# Patient Record
Sex: MALE | Race: WHITE | URBAN - METROPOLITAN AREA
[De-identification: names, ages, dates, MRNs, and addresses within clinical notes are randomized per-mention and may not be internally consistent; named-entity substitution may affect disease eponyms.]

---

## 2017-04-27 ENCOUNTER — OFFICE VISIT (OUTPATIENT)
Dept: FAMILY MEDICINE | Facility: CLINIC | Age: 37
End: 2017-04-27
Payer: COMMERCIAL

## 2017-04-27 VITALS
HEART RATE: 64 BPM | WEIGHT: 137 LBS | BODY MASS INDEX: 22.02 KG/M2 | TEMPERATURE: 96.2 F | HEIGHT: 66 IN | SYSTOLIC BLOOD PRESSURE: 112 MMHG | DIASTOLIC BLOOD PRESSURE: 60 MMHG

## 2017-04-27 DIAGNOSIS — Z00.00 ROUTINE GENERAL MEDICAL EXAMINATION AT A HEALTH CARE FACILITY: ICD-10-CM

## 2017-04-27 DIAGNOSIS — K21.9 GASTROESOPHAGEAL REFLUX DISEASE WITHOUT ESOPHAGITIS: ICD-10-CM

## 2017-04-27 DIAGNOSIS — R35.0 URINARY FREQUENCY: ICD-10-CM

## 2017-04-27 DIAGNOSIS — Z13.6 CARDIOVASCULAR SCREENING; LDL GOAL LESS THAN 160: Primary | ICD-10-CM

## 2017-04-27 LAB
ALBUMIN UR-MCNC: NEGATIVE MG/DL
APPEARANCE UR: CLEAR
BILIRUB UR QL STRIP: NEGATIVE
COLOR UR AUTO: YELLOW
GLUCOSE UR STRIP-MCNC: NEGATIVE MG/DL
HGB UR QL STRIP: NEGATIVE
KETONES UR STRIP-MCNC: NEGATIVE MG/DL
LEUKOCYTE ESTERASE UR QL STRIP: NEGATIVE
NITRATE UR QL: NEGATIVE
PH UR STRIP: 7 PH (ref 5–7)
SP GR UR STRIP: 1.02 (ref 1–1.03)
URN SPEC COLLECT METH UR: NORMAL
UROBILINOGEN UR STRIP-ACNC: 0.2 EU/DL (ref 0.2–1)

## 2017-04-27 PROCEDURE — 99395 PREV VISIT EST AGE 18-39: CPT | Performed by: FAMILY MEDICINE

## 2017-04-27 PROCEDURE — 99212 OFFICE O/P EST SF 10 MIN: CPT | Mod: 25 | Performed by: FAMILY MEDICINE

## 2017-04-27 PROCEDURE — 81003 URINALYSIS AUTO W/O SCOPE: CPT | Performed by: FAMILY MEDICINE

## 2017-04-27 RX ORDER — NICOTINE POLACRILEX 4 MG/1
20 GUM, CHEWING ORAL DAILY
Qty: 30 TABLET | Refills: 3 | Status: SHIPPED | OUTPATIENT
Start: 2017-04-27 | End: 2018-01-03

## 2017-04-27 NOTE — MR AVS SNAPSHOT
After Visit Summary   4/27/2017    Sudarshan Melendez    MRN: [id number]           Patient Information     Date Of Birth          1980        Visit Information        Provider Department      4/27/2017 1:00 PM Lauro Sanchez MD [facility]        Today's Diagnoses     CARDIOVASCULAR SCREENING; LDL GOAL LESS THAN 160    -  1    Routine general medical examination at a health care facility        Gastroesophageal reflux disease without esophagitis        Urinary frequency          Care Instructions      Preventive Health Recommendations  Male Ages 26 - 39    Yearly exam:             See your health care provider every year in order to  o   Review health changes.   o   Discuss preventive care.    o   Review your medicines if your doctor has prescribed any.    You should be tested each year for STDs (sexually transmitted diseases), if you re at risk.     After age 35, talk to your provider about cholesterol testing. If you are at risk for heart disease, have your cholesterol tested at least every 5 years.     If you are at risk for diabetes, you should have a diabetes test (fasting glucose).  Shots: Get a flu shot each year. Get a tetanus shot every 10 years.     Nutrition:    Eat at least 5 servings of fruits and vegetables daily.     Eat whole-grain bread, whole-wheat pasta and brown rice instead of white grains and rice.     Talk to your provider about Calcium and Vitamin D.     Lifestyle    Exercise for at least 150 minutes a week (30 minutes a day, 5 days a week). This will help you control your weight and prevent disease.     Limit alcohol to one drink per day.     No smoking.     Wear sunscreen to prevent skin cancer.     See your dentist every six months for an exam and cleaning.           Follow-ups after your visit        Future tests that were ordered for you today     Open Future Orders        Priority Expected Expires Ordered    Vitamin D Deficiency Routine  4/27/2018  "2017    Comprehensive metabolic panel Routine  2018    Lipid Profile (Chol, Trig, HDL, LDL calc) Routine  2018    Hemoglobin A1c Routine  2018            Who to contact     If you have questions or need follow up information about today's clinic visit or your schedule please contact [facility] [place] directly at [phone].  Normal or non-critical lab and imaging results will be communicated to you by Mobiihart, letter or phone within 4 business days after the clinic has received the results. If you do not hear from us within 7 days, please contact the clinic through Mobiihart or phone. If you have a critical or abnormal lab result, we will notify you by phone as soon as possible.  Submit refill requests through [employer] or call your pharmacy and they will forward the refill request to us. Please allow 3 business days for your refill to be completed.          Additional Information About Your Visit        [employer] Information     [employer] lets you send messages to your doctor, view your test results, renew your prescriptions, schedule appointments and more. To sign up, go to [email or web address] . Click on \"Log in\" on the left side of the screen, which will take you to the Welcome page. Then click on \"Sign up Now\" on the right side of the page.     You will be asked to enter the access code listed below, as well as some personal information. Please follow the directions to create your username and password.     Your access code is: [email or web address]  Expires: 2017  1:42 PM     Your access code will  in 90 days. If you need help or a new code, please call your [facility] clinic or [phone].        Care EveryWhere ID     This is your Care EveryWhere ID. This could be used by other organizations to access your [facility] medical records  [id number]        Your Vitals Were     Pulse Temperature Height BMI (Body Mass Index)          64 96.2  F (35.7  C) " "(Tympanic) 5' 6\" (1.676 m) 22.11 kg/m2         Blood Pressure from Last 3 Encounters:   04/27/17 112/60   10/26/15 108/64   02/20/14 118/74    Weight from Last 3 Encounters:   04/27/17 137 lb (62.1 kg)   10/26/15 143 lb (64.9 kg)   02/20/14 136 lb (61.7 kg)              We Performed the Following     UA reflex to Microscopic          Today's Medication Changes          These changes are accurate as of: 4/27/17  1:42 PM.  If you have any questions, ask your nurse or doctor.               Stop taking these medicines if you haven't already. Please contact your care team if you have questions.     saccharomyces boulardii 250 MG capsule   Commonly known as:  FLORASTOR   Stopped by:  Lauro Sanchez MD                Where to get your medicines      These medications were sent to [facility] [address] [place] [address] AT [facility] OF [facility] [address] [address]     Phone:  [phone]     omeprazole 20 MG tablet                Primary Care Provider Office Phone # Fax #    Lucian RiveraDO [phone]       [address] [address]        Thank you!     Thank you for choosing [facility]  for your care. Our goal is always to provide you with excellent care. Hearing back from our patients is one way we can continue to improve our services. Please take a few minutes to complete the written survey that you may receive in the mail after your visit with us. Thank you!             Your Updated Medication List - Protect others around you: Learn how to safely use, store and throw away your medicines at www.disposemymeds.org.          This list is accurate as of: 4/27/17  1:42 PM.  Always use your most recent med list.                   Brand Name Dispense Instructions for use    CLARITIN 10 MG tablet   Generic drug:  loratadine     30 tablet    Take 1 tablet by mouth daily.       MELATONIN PO      Take 3 mg " by mouth nightly as needed       Multi-vitamin Tabs tablet     100 tablet    Take 1 tablet by mouth daily.       omeprazole 20 MG tablet     30 tablet    Take 1 tablet (20 mg) by mouth daily Take 30-60 minutes before a meal.

## 2017-04-27 NOTE — PROGRESS NOTES
"  SUBJECTIVE:     CC: Sudarshan Melendez is an 36 year old male who presents for preventative health visit.     Healthy Habits:    Do you get at least three servings of calcium containing foods daily (dairy, green leafy vegetables, etc.)? yes    Amount of exercise or daily activities, outside of work: 6-7 day(s) per week    Problems taking medications regularly not applicable    Medication side effects: No    Have you had an eye exam in the past two years? yes    Do you see a dentist twice per year? yes    Do you have sleep apnea, excessive snoring or daytime drowsiness?no        Frequent urination, he also drink more fluids as he is training for a marathon.   No dysuria.  [place]. GERD issues, takes omeprazole on and off, has used \" other medication\" not sure the name he will check and let us know.    Today's PHQ-2 Score:   PHQ-2 ( 1999 Pfizer) 4/27/2017 10/26/2015   Q1: Little interest or pleasure in doing things 0 0   Q2: Feeling down, depressed or hopeless 0 0   PHQ-2 Score 0 0       Abuse: Current or Past(Physical, Sexual or Emotional)- No  Do you feel safe in your environment - Yes    Social History   Substance Use Topics     Smoking status: Never Smoker     Smokeless tobacco: Never Used     Alcohol use 0.0 oz/week     0 Standard drinks or equivalent per week      Comment: one drink per week      The patient does not drink >3 drinks per day nor >7 drinks per week.    Last PSA:   PSA   Date Value Ref Range Status   04/06/2011 0.298 ng/mL Final       Recent Labs   Lab Test  10/26/15   0945 05/29/13   CHOL  211*  217*   HDL  55  58   LDL  143*  146   TRIG  63  65   CHOLHDLRATIO  3.8  3.7       Reviewed orders with patient. Reviewed health maintenance and updated orders accordingly - Yes    Reviewed and updated as needed this visit by clinical staff  Tobacco  Allergies  Meds  Fam Hx  Soc Hx        Reviewed and updated as needed this visit by Provider            ROS:  C: " "NEGATIVE for fever, chills, change in weight  I: NEGATIVE for worrisome rashes, moles or lesions  E: NEGATIVE for vision changes or irritation  ENT: NEGATIVE for ear, mouth and throat problems  R: NEGATIVE for significant cough or SOB  CV: NEGATIVE for chest pain, palpitations or peripheral edema  GI: NEGATIVE for nausea, abdominal pain, heartburn, or change in bowel habits   male: negative for dysuria, hematuria, decreased urinary stream, erectile dysfunction, urethral discharge  M: NEGATIVE for significant arthralgias or myalgia  N: NEGATIVE for weakness, dizziness or paresthesias  P: NEGATIVE for changes in mood or affect    Problem list, Medication list, Allergies, and Medical/Social/Surgical histories reviewed in [facility] and updated as appropriate.  Labs reviewed in EPIC  BP Readings from Last 3 Encounters:   04/27/17 112/60   10/26/15 108/64   02/20/14 118/74    Wt Readings from Last 3 Encounters:   04/27/17 137 lb (62.1 kg)   10/26/15 143 lb (64.9 kg)   02/20/14 136 lb (61.7 kg)                  OBJECTIVE:     /60  Pulse 64  Temp 96.2  F (35.7  C) (Tympanic)  Ht 5' 6\" (1.676 m)  Wt 137 lb (62.1 kg)  BMI 22.11 kg/m2  EXAM:  GENERAL: healthy, alert and no distress  EYES: Eyes grossly normal to inspection, PERRL and conjunctivae and sclerae normal  HENT: ear canals and TM's normal, nose and mouth without ulcers or lesions  NECK: no adenopathy, no asymmetry, masses, or scars and thyroid normal to palpation  RESP: lungs clear to auscultation - no rales, rhonchi or wheezes  CV: regular rate and rhythm, normal S1 S2, no S3 or S4, no murmur, click or rub, no peripheral edema and peripheral pulses strong  ABDOMEN: soft, nontender, no hepatosplenomegaly, no masses and bowel sounds normal  MS: no gross musculoskeletal defects noted, no edema  SKIN: no suspicious lesions or rashes  NEURO: Normal strength and tone, mentation intact and speech normal  PSYCH: mentation appears normal, affect " "normal/bright    ASSESSMENT/PLAN:     1. Routine general medical examination at a health care facility  Labs ordered for future, he will come and get it done.  - Comprehensive metabolic panel; Future  - Lipid Profile (Chol, Trig, HDL, LDL calc); Future  - Hemoglobin A1c; Future  - UA reflex to Microscopic  - Vitamin D Deficiency; Future    2. CARDIOVASCULAR SCREENING; LDL GOAL LESS THAN 160    - Lipid Profile (Chol, Trig, HDL, LDL calc); Future    3. Gastroesophageal reflux disease without esophagitis    - omeprazole 20 MG tablet; Take 1 tablet (20 mg) by mouth daily Take 30-60 minutes before a meal.  Dispense: 30 tablet; Refill: 3    4. Urinary frequency  Discussed with the patient. We will get the urine and other test and will follow up on that.  - Comprehensive metabolic panel; Future  - Hemoglobin A1c; Future  - UA reflex to Microscopic    COUNSELING:  Reviewed preventive health counseling, as reflected in patient instructions       Regular exercise       Healthy diet/nutrition         reports that he has never smoked. He has never used smokeless tobacco.    Estimated body mass index is 22.11 kg/(m^2) as calculated from the following:    Height as of this encounter: 5' 6\" (1.676 m).    Weight as of this encounter: 137 lb (62.1 kg).       Counseling Resources:  ATP IV Guidelines  Pooled Cohorts Equation Calculator  FRAX Risk Assessment  ICSI Preventive Guidelines  Dietary Guidelines for Americans, 2010  USDA's MyPlate  ASA Prophylaxis  Lung CA Screening    Lauro Sanchez MD  [facility]  "

## 2017-05-04 ENCOUNTER — TELEPHONE (OUTPATIENT)
Dept: FAMILY MEDICINE | Facility: CLINIC | Age: 37
End: 2017-05-04

## 2017-05-04 NOTE — TELEPHONE ENCOUNTER
Patient call wondering about his UA. Per chart review, UA returned normal.   Report frequency and urgency - recent increased in fluid intake due to training for full marathon.   Informed of normal UA.  Advised to drink small frequent amount as opposed to large quantity.   Will monitor symptoms and come in for future lab work tomorrow.      Jacy Villagomez RN

## 2017-05-05 DIAGNOSIS — Z13.6 CARDIOVASCULAR SCREENING; LDL GOAL LESS THAN 160: ICD-10-CM

## 2017-05-05 DIAGNOSIS — R35.0 URINARY FREQUENCY: ICD-10-CM

## 2017-05-05 DIAGNOSIS — Z00.00 ROUTINE GENERAL MEDICAL EXAMINATION AT A HEALTH CARE FACILITY: ICD-10-CM

## 2017-05-05 LAB
ALBUMIN SERPL-MCNC: 4.4 G/DL (ref 3.4–5)
ALP SERPL-CCNC: 74 U/L (ref 40–150)
ALT SERPL W P-5'-P-CCNC: 32 U/L (ref 0–70)
ANION GAP SERPL CALCULATED.3IONS-SCNC: 6 MMOL/L (ref 3–14)
AST SERPL W P-5'-P-CCNC: 22 U/L (ref 0–45)
BILIRUB SERPL-MCNC: 1 MG/DL (ref 0.2–1.3)
BUN SERPL-MCNC: 17 MG/DL (ref 7–30)
CALCIUM SERPL-MCNC: 9.6 MG/DL (ref 8.5–10.1)
CHLORIDE SERPL-SCNC: 108 MMOL/L (ref 94–109)
CHOLEST SERPL-MCNC: 194 MG/DL
CO2 SERPL-SCNC: 28 MMOL/L (ref 20–32)
CREAT SERPL-MCNC: 1.07 MG/DL (ref 0.66–1.25)
DEPRECATED CALCIDIOL+CALCIFEROL SERPL-MC: 33 UG/L (ref 20–75)
GFR SERPL CREATININE-BSD FRML MDRD: 78 ML/MIN/1.7M2
GLUCOSE SERPL-MCNC: 103 MG/DL (ref 70–99)
HBA1C MFR BLD: 5 % (ref 4.3–6)
HDLC SERPL-MCNC: 49 MG/DL
LDLC SERPL CALC-MCNC: 134 MG/DL
NONHDLC SERPL-MCNC: 145 MG/DL
POTASSIUM SERPL-SCNC: 4.5 MMOL/L (ref 3.4–5.3)
PROT SERPL-MCNC: 7.5 G/DL (ref 6.8–8.8)
SODIUM SERPL-SCNC: 142 MMOL/L (ref 133–144)
TRIGL SERPL-MCNC: 57 MG/DL

## 2017-05-05 PROCEDURE — 83036 HEMOGLOBIN GLYCOSYLATED A1C: CPT | Performed by: FAMILY MEDICINE

## 2017-05-05 PROCEDURE — 82306 VITAMIN D 25 HYDROXY: CPT | Performed by: FAMILY MEDICINE

## 2017-05-05 PROCEDURE — 80061 LIPID PANEL: CPT | Performed by: FAMILY MEDICINE

## 2017-05-05 PROCEDURE — 36415 COLL VENOUS BLD VENIPUNCTURE: CPT | Performed by: FAMILY MEDICINE

## 2017-05-05 PROCEDURE — 80053 COMPREHEN METABOLIC PANEL: CPT | Performed by: FAMILY MEDICINE

## 2017-06-05 DIAGNOSIS — K21.9 GASTROESOPHAGEAL REFLUX DISEASE WITHOUT ESOPHAGITIS: ICD-10-CM

## 2017-06-06 NOTE — TELEPHONE ENCOUNTER
Omeprazole      Last Written Prescription Date: 4/27/17  Last Fill Quantity: 30,  # refills: 3   Last Office Visit with G, P or [facility] prescribing provider: 4/27/17    Darcie Chan CMA

## 2018-04-06 ENCOUNTER — TELEPHONE (OUTPATIENT)
Dept: FAMILY MEDICINE | Facility: CLINIC | Age: 38
End: 2018-04-06

## 2018-04-06 NOTE — TELEPHONE ENCOUNTER
Reason for Call: Request for an order or referral:    Order or referral being requested: Urology     Date needed: as soon as possible    Has the patient been seen by the PCP for this problem? NO    Additional comments: Vasectomy, did consultation at [facility]. In order to have at specialty center needs referral.     Phone number Patient can be reached at:  Home number on file [phone] (home)    Best Time:  Anytime     Can we leave a detailed message on this number?  YES    Call taken on 4/6/2018 at 3:53 PM by Yen De La Fuente

## 2018-04-09 NOTE — TELEPHONE ENCOUNTER
Dr Sanchez- Patient is calling asking for an out of network referral to [facility] for a Vasectomy. I looked in his chart and you never referred him to [facility] for the consult. Since we have those services within [place] we are not suppose to do the out of network referral. Patient did self refer for the consult. Please advise? Thanks    Keena Pearson  Referral Coordinator

## 2018-04-09 NOTE — TELEPHONE ENCOUNTER
If he want to go out of network for this service , he can talk to his insurance, we can refer him, but advice him if he want this to be done here we have services available, we can refer him to [place].  Either way is fine.

## 2018-04-20 ENCOUNTER — OFFICE VISIT (OUTPATIENT)
Dept: ORTHOPEDICS | Facility: CLINIC | Age: 38
End: 2018-04-20
Payer: COMMERCIAL

## 2018-04-20 VITALS
SYSTOLIC BLOOD PRESSURE: 114 MMHG | DIASTOLIC BLOOD PRESSURE: 62 MMHG | HEIGHT: 66 IN | WEIGHT: 137 LBS | BODY MASS INDEX: 22.02 KG/M2

## 2018-04-20 DIAGNOSIS — M76.31 ILIOTIBIAL BAND SYNDROME AFFECTING LOWER LEG, RIGHT: Primary | ICD-10-CM

## 2018-04-20 PROCEDURE — 99203 OFFICE O/P NEW LOW 30 MIN: CPT | Performed by: FAMILY MEDICINE

## 2018-04-20 NOTE — PROGRESS NOTES
[facility]   Clinic Visit s Apr 20, 2018    PCP: Lucian Rivera      Sudarshan is a 37 year old male who is seen as self referral for   Chief Complaint   Patient presents with     Musculoskeletal Problem     right lateral knee pain       Injury: Patient describes injury as overuse from running     right-hand dominant    Location of Pain: right knee lateral, nonradiating   Duration of Pain: 4 week(s)  Rating of Pain at worst: 7/10, unstable posterior knee  Rating of Pain Currently: 2/10,   Pain is better with: rest   Pain is worse with: running and increasing mileage   Treatment so far consists of: activity avoidance and ice  Associated symptoms: no distal numbness or tingling; denies  Warmth - reports some soreness  Recent imaging completed: No recent imaging completed.  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     Priority: Medium     GERD (gastroesophageal reflux disease) 07/03/2013     Priority: Medium       Family History   Problem Relation Age of Onset     Neurologic Disorder Mother      huningtons disease     DIABETES Father      type 2     Hypertension Father      Lipids Father      CEREBROVASCULAR DISEASE Maternal Grandmother      Alzheimer Disease Maternal Grandfather      DIABETES Paternal Grandmother        Social History     Social History     Marital status:      Spouse name: wife = Marry     Number of children: 0     Years of education: N/A     Occupational History     marketing      Social History Main Topics     Smoking status: Never Smoker     Smokeless tobacco: Never Used     Alcohol use 0.0 oz/week     0  "Standard drinks or equivalent per week      Comment: one drink per week      Drug use: No     Sexual activity: Yes     Partners: Female     Other Topics Concern      Service No     Blood Transfusions No     Caffeine Concern No     Occupational Exposure No     Hobby Hazards Yes     sports     Sleep Concern No     Stress Concern No     Weight Concern No     Special Diet No     Back Care No     Exercise Yes     3 times per week     Bike Helmet No     Seat Belt Yes     Self-Exams Yes     occ     Social History Narrative       Past Surgical History:   Procedure Laterality Date     wisdom teeth               Review of Systems   Musculoskeletal: Positive for joint pain.   All other systems reviewed and are negative.        Physical Exam   Musculoskeletal:        Left hip: Normal.     /62  Ht 5' 6\" (1.676 m)  Wt 137 lb (62.1 kg)  BMI 22.11 kg/m2  Constitutional:well-developed, well-nourished, and in no distress.   Cardiovascular: Intact distal pulses.    Neurological: alert. Gait Normal:   Gait, station, stance, and balance appear normal for age  Skin: Skin is warm and dry.   Psychiatric: Mood and affect normal.   Respiratory: unlabored, speaks in full sentences  Lymph: no LAD, no lymphangitis          Left Knee Exam   Swelling: None  Effusion: No    Tenderness   None    Range of Motion   Extension: Normal  Flexion:     Normal    Tests   McMurrays:  Medial - Negative      Lateral - Negative  Lachman:  Anterior - Negative    Posterior - Negative  Drawer:       Anterior - Negative     Posterior - Negative  Varus:  Negative  Valgus: Negative  Pivot Shift: Negative  Patellar Apprehension: No    Right Knee Exam   Swelling: None  Effusion: No    Tenderness   The patient is experiencing tenderness in the lateral aspect of lateral femoral condyle.    Range of Motion   Extension: Normal  Flexion:     Normal    Tests   McMurrays:  Medial - Negative      Lateral - Negative  Lachman:  Anterior - Negative    Posterior - " Negative  Drawer:       Anterior - Negative    Posterior - Negative  Varus:  Negative  Valgus: Negative  Pivot Shift: Negative  Patellar Apprehension: No    Left Hip Exam   Left hip exam is normal.    Tenderness   None    Range of Motion   Normal left hip ROM    Muscle Strength   Normal left hip strength    Right Hip Exam     Tenderness   None    Range of Motion   Normal right hip ROM    Muscle Strength   Abduction:  4/5  Adduction:  5/5  Flexion:      5/5    Comments:  Uses quad for abduction          Assessment:    ICD-10-CM    1. Iliotibial band syndrome affecting lower leg, right M76.31        Nature of syndrome as friction related against lateral aspect of lateral femoral condyle discussed.  Triggers for patient might be related to running on a sloped surface in new shoes, and going further than he was accustomed to.  He does have abnormal hip mechanics and that he uses his right quad for abduction more than gluteus medius muscle.  Educated on hip abduction exercises and advised against performing abduction exercises in flexion.  Focus should be on glute medius muscle.  Consider formal physical therapy referral, may call for referral if desired.

## 2018-04-20 NOTE — MR AVS SNAPSHOT
"              After Visit Summary   4/20/2018    Sudarshan Melendez    MRN: [id number]           Patient Information     Date Of Birth          1980        Visit Information        Provider Department      4/20/2018 4:00 PM Osmin Barnes MD [facility] [place]        Today's Diagnoses     Iliotibial band syndrome affecting lower leg, right    -  1       Follow-ups after your visit        Who to contact     If you have questions or need follow up information about today's clinic visit or your schedule please contact [facility] [place] directly at [phone].  Normal or non-critical lab and imaging results will be communicated to you by MyChart, letter or phone within 4 business days after the clinic has received the results. If you do not hear from us within 7 days, please contact the clinic through [employer]tech[employer] or phone. If you have a critical or abnormal lab result, we will notify you by phone as soon as possible.  Submit refill requests through [employer] or call your pharmacy and they will forward the refill request to us. Please allow 3 business days for your refill to be completed.          Additional Information About Your Visit        MyChart Information     [employer] gives you secure access to your electronic health record. If you see a primary care provider, you can also send messages to your care team and make appointments. If you have questions, please call your primary care clinic.  If you do not have a primary care provider, please call [phone] and they will assist you.        Care EveryWhere ID     This is your Care EveryWhere ID. This could be used by other organizations to access your [facility] medical records  [id number]        Your Vitals Were     Height BMI (Body Mass Index)                5' 6\" (1.676 m) 22.11 kg/m2           Blood Pressure from Last 3 Encounters:   04/20/18 114/62   04/27/17 112/60   10/26/15 108/64    Weight " from Last 3 Encounters:   04/20/18 137 lb (62.1 kg)   04/27/17 137 lb (62.1 kg)   10/26/15 143 lb (64.9 kg)              Today, you had the following     No orders found for display       Primary Care Provider Office Phone # Fax #    Lucian Rivera [phone]       [address] [address]        Equal Access to Services     MARYAN DING : Hadii aad ku hadasho Soomaali, waaxda luqadaha, qaybta kaalmada adeegyada, waxay idiin hayaan adeeg kharatawana labrittney . So [facility] [phone].    ATENCIÓN: Si denala michael, tiene a daley disposición servicios gratuitos de asistencia lingüística. Llame al [phone].    We comply with applicable federal civil rights laws and [place] laws. We do not discriminate on the basis of race, color, national origin, age, disability, sex, sexual orientation, or gender identity.            Thank you!     Thank you for choosing [facility] [place]  for your care. Our goal is always to provide you with excellent care. Hearing back from our patients is one way we can continue to improve our services. Please take a few minutes to complete the written survey that you may receive in the mail after your visit with us. Thank you!             Your Updated Medication List - Protect others around you: Learn how to safely use, store and throw away your medicines at www.disposemymeds.org.          This list is accurate as of 4/20/18  6:05 PM.  Always use your most recent med list.                   Brand Name Dispense Instructions for use Diagnosis    CLARITIN 10 MG tablet   Generic drug:  loratadine     30 tablet    Take 1 tablet by mouth daily.        MELATONIN PO      Take 3 mg by mouth nightly as needed        Multi-vitamin Tabs tablet     100 tablet    Take 1 tablet by mouth daily.        omeprazole 20 MG CR capsule    priLOSEC    90 capsule    TAKE 1 CAPSULE DAILY 30 TO 60 MINUTES BEFORE A MEAL    Gastroesophageal  reflux disease without esophagitis

## 2018-04-20 NOTE — LETTER
4/20/2018         RE: Sudarshan Melendez  [address] [facility] [address]        Dear Colleague,    Thank you for referring your patient, Sudarshan Melendez, to the [facility]. Please see a copy of my visit note below.    HPI   [facility]   Clinic Visit s Apr 20, 2018    PCP: Lucian Riveranayana Heaton is a 37 year old male who is seen as self referral for   Chief Complaint   Patient presents with     Musculoskeletal Problem     right lateral knee pain       Injury: Patient describes injury as overuse from running     right-hand dominant    Location of Pain: right knee lateral, nonradiating   Duration of Pain: 4 week(s)  Rating of Pain at worst: 7/10, unstable posterior knee  Rating of Pain Currently: 2/10,   Pain is better with: rest   Pain is worse with: running and increasing mileage   Treatment so far consists of: activity avoidance and ice  Associated symptoms: no distal numbness or tingling; denies  Warmth - reports some soreness  Recent imaging completed: No recent imaging completed.  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     Priority: Medium     GERD (gastroesophageal reflux disease) 07/03/2013     Priority: Medium       Family History   Problem Relation Age of Onset     Neurologic Disorder Mother      huningtons disease     DIABETES Father      type 2     Hypertension Father      Lipids Father      CEREBROVASCULAR DISEASE Maternal Grandmother      Alzheimer Disease Maternal Grandfather      DIABETES Paternal Grandmother        Social History     Social History     Marital status:  "     Spouse name: wife = Marry     Number of children: 0     Years of education: N/A     Occupational History     marketing      Social History Main Topics     Smoking status: Never Smoker     Smokeless tobacco: Never Used     Alcohol use 0.0 oz/week     0 Standard drinks or equivalent per week      Comment: one drink per week      Drug use: No     Sexual activity: Yes     Partners: Female     Other Topics Concern      Service No     Blood Transfusions No     Caffeine Concern No     Occupational Exposure No     Hobby Hazards Yes     sports     Sleep Concern No     Stress Concern No     Weight Concern No     Special Diet No     Back Care No     Exercise Yes     3 times per week     Bike Helmet No     Seat Belt Yes     Self-Exams Yes     occ     Social History Narrative       Past Surgical History:   Procedure Laterality Date     wisdom teeth               Review of Systems   Musculoskeletal: Positive for joint pain.   All other systems reviewed and are negative.        Physical Exam   Musculoskeletal:        Left hip: Normal.     /62  Ht 5' 6\" (1.676 m)  Wt 137 lb (62.1 kg)  BMI 22.11 kg/m2  Constitutional:well-developed, well-nourished, and in no distress.   Cardiovascular: Intact distal pulses.    Neurological: alert. Gait Normal:   Gait, station, stance, and balance appear normal for age  Skin: Skin is warm and dry.   Psychiatric: Mood and affect normal.   Respiratory: unlabored, speaks in full sentences  Lymph: no LAD, no lymphangitis          Left Knee Exam   Swelling: None  Effusion: No    Tenderness   None    Range of Motion   Extension: Normal  Flexion:     Normal    Tests   McMurrays:  Medial - Negative      Lateral - Negative  Lachman:  Anterior - Negative    Posterior - Negative  Drawer:       Anterior - Negative     Posterior - Negative  Varus:  Negative  Valgus: Negative  Pivot Shift: Negative  Patellar Apprehension: No    Right Knee Exam   Swelling: None  Effusion: " No    Tenderness   The patient is experiencing tenderness in the lateral aspect of lateral femoral condyle.    Range of Motion   Extension: Normal  Flexion:     Normal    Tests   McMurrays:  Medial - Negative      Lateral - Negative  Lachman:  Anterior - Negative    Posterior - Negative  Drawer:       Anterior - Negative    Posterior - Negative  Varus:  Negative  Valgus: Negative  Pivot Shift: Negative  Patellar Apprehension: No    Left Hip Exam   Left hip exam is normal.    Tenderness   None    Range of Motion   Normal left hip ROM    Muscle Strength   Normal left hip strength    Right Hip Exam     Tenderness   None    Range of Motion   Normal right hip ROM    Muscle Strength   Abduction:  4/5  Adduction:  5/5  Flexion:      5/5    Comments:  Uses quad for abduction          Assessment:    ICD-10-CM    1. Iliotibial band syndrome affecting lower leg, right M76.31        Nature of syndrome as friction related against lateral aspect of lateral femoral condyle discussed.  Triggers for patient might be related to running on a sloped surface in new shoes, and going further than he was accustomed to.  He does have abnormal hip mechanics and that he uses his right quad for abduction more than gluteus medius muscle.  Educated on hip abduction exercises and advised against performing abduction exercises in flexion.  Focus should be on glute medius muscle.  Consider formal physical therapy referral, may call for referral if desired.      Again, thank you for allowing me to participate in the care of your patient.        Sincerely,        Osmin Barnes MD

## 2019-05-03 ENCOUNTER — TELEPHONE (OUTPATIENT)
Dept: FAMILY MEDICINE | Facility: CLINIC | Age: 39
End: 2019-05-03

## 2019-05-03 NOTE — TELEPHONE ENCOUNTER
Patient is calling for a referral to [facility] [place]. Patient has not been seen in clinic since 2017. Told patient he needed an OV before anymore referrals are given.    Keena Pearson  Referral Coordinator

## 2019-05-21 ENCOUNTER — TELEPHONE (OUTPATIENT)
Dept: FAMILY MEDICINE | Facility: CLINIC | Age: 39
End: 2019-05-21

## 2019-05-21 NOTE — TELEPHONE ENCOUNTER
Reason for Call:  Other Referral for [facility]    Detailed comments: Pt called this morning and would like Dr. Sanchez to place a referral/ PA for the pt to be seen at [facility] for neurology. Please send this referral/ order/ PA to [facility]- neurology. If there are any questions for the pt please feel free to contact him at the number listed below. Thank you.    Phone Number Patient can be reached at: Home number on file [phone] (home)    Best Time:     Can we leave a detailed message on this number? YES    Call taken on 5/21/2019 at 11:48 AM by Lorna Slaughter

## 2019-05-21 NOTE — TELEPHONE ENCOUNTER
TC called patient to advise he will need to be seen prior to a referral being placed by Dr. Sanchez. TC left patient a vm stating above information, gave clinic number for patient to call back with any further questions.      .Lilly SMALLWOOD    [facility]

## 2019-05-21 NOTE — TELEPHONE ENCOUNTER
Reason for Call:  Other REFERRAL FOR [facility]UR[facility]    Detailed comments: Pt called this afternoon and would like Dr. Sanchez to fax over the neurology referral to [facility] to the following fax number: [phone]. If there are any questions for the Neurology department at [facility] please feel free to contact them at the following number: [phone]. Thank you.    Phone Number Patient can be reached at: Home number on file [phone] (home)    Best Time:     Can we leave a detailed message on this number? YES    Call taken on 5/21/2019 at 4:44 PM by Lorna Slaughter

## 2019-05-23 ENCOUNTER — OFFICE VISIT (OUTPATIENT)
Dept: FAMILY MEDICINE | Facility: CLINIC | Age: 39
End: 2019-05-23
Payer: COMMERCIAL

## 2019-05-23 ENCOUNTER — TELEPHONE (OUTPATIENT)
Dept: LAB | Facility: CLINIC | Age: 39
End: 2019-05-23

## 2019-05-23 VITALS
BODY MASS INDEX: 22.02 KG/M2 | SYSTOLIC BLOOD PRESSURE: 104 MMHG | HEIGHT: 66 IN | WEIGHT: 137 LBS | HEART RATE: 62 BPM | DIASTOLIC BLOOD PRESSURE: 60 MMHG | TEMPERATURE: 97 F | OXYGEN SATURATION: 98 %

## 2019-05-23 DIAGNOSIS — Z13.21 ENCOUNTER FOR VITAMIN DEFICIENCY SCREENING: ICD-10-CM

## 2019-05-23 DIAGNOSIS — Z13.6 CARDIOVASCULAR SCREENING; LDL GOAL LESS THAN 160: ICD-10-CM

## 2019-05-23 DIAGNOSIS — K21.9 GASTROESOPHAGEAL REFLUX DISEASE WITHOUT ESOPHAGITIS: ICD-10-CM

## 2019-05-23 DIAGNOSIS — Z00.00 ENCOUNTER FOR ANNUAL PHYSICAL EXAM: ICD-10-CM

## 2019-05-23 DIAGNOSIS — R69: ICD-10-CM

## 2019-05-23 DIAGNOSIS — R68.89 FORGETFULNESS: ICD-10-CM

## 2019-05-23 DIAGNOSIS — Z13.220 SCREENING FOR HYPERLIPIDEMIA: Primary | ICD-10-CM

## 2019-05-23 LAB
ALBUMIN SERPL-MCNC: 4.6 G/DL (ref 3.4–5)
ALP SERPL-CCNC: 74 U/L (ref 40–150)
ALT SERPL W P-5'-P-CCNC: 30 U/L (ref 0–70)
ANION GAP SERPL CALCULATED.3IONS-SCNC: 4 MMOL/L (ref 3–14)
AST SERPL W P-5'-P-CCNC: 24 U/L (ref 0–45)
BILIRUB SERPL-MCNC: 1.4 MG/DL (ref 0.2–1.3)
BUN SERPL-MCNC: 19 MG/DL (ref 7–30)
CALCIUM SERPL-MCNC: 10.3 MG/DL (ref 8.5–10.1)
CHLORIDE SERPL-SCNC: 107 MMOL/L (ref 94–109)
CHOLEST SERPL-MCNC: 239 MG/DL
CO2 SERPL-SCNC: 27 MMOL/L (ref 20–32)
CREAT SERPL-MCNC: 1.13 MG/DL (ref 0.66–1.25)
DEPRECATED CALCIDIOL+CALCIFEROL SERPL-MC: 32 UG/L (ref 20–75)
GFR SERPL CREATININE-BSD FRML MDRD: 82 ML/MIN/{1.73_M2}
GLUCOSE SERPL-MCNC: 98 MG/DL (ref 70–99)
HDLC SERPL-MCNC: 60 MG/DL
LDLC SERPL CALC-MCNC: 163 MG/DL
NONHDLC SERPL-MCNC: 179 MG/DL
POTASSIUM SERPL-SCNC: 4.6 MMOL/L (ref 3.4–5.3)
PROT SERPL-MCNC: 7.9 G/DL (ref 6.8–8.8)
SODIUM SERPL-SCNC: 138 MMOL/L (ref 133–144)
TRIGL SERPL-MCNC: 82 MG/DL

## 2019-05-23 PROCEDURE — 80061 LIPID PANEL: CPT | Performed by: FAMILY MEDICINE

## 2019-05-23 PROCEDURE — 99395 PREV VISIT EST AGE 18-39: CPT | Performed by: FAMILY MEDICINE

## 2019-05-23 PROCEDURE — 80053 COMPREHEN METABOLIC PANEL: CPT | Performed by: FAMILY MEDICINE

## 2019-05-23 PROCEDURE — 82306 VITAMIN D 25 HYDROXY: CPT | Performed by: FAMILY MEDICINE

## 2019-05-23 PROCEDURE — 36415 COLL VENOUS BLD VENIPUNCTURE: CPT | Performed by: FAMILY MEDICINE

## 2019-05-23 ASSESSMENT — MIFFLIN-ST. JEOR: SCORE: 1489.18

## 2019-05-23 NOTE — LETTER
May 28, 2019      Sudarshan Melendez  [address] [facility]  [place] [address]        Dear ,      I have reviewed your recent labs. Here are the results:     -Liver and gallbladder tests are normal (ALT,AST, Alk phos,)bilirubin is near normal. kidney function is normal (Cr, GFR), sodium is normal, potassium is normal, calcium is normal, glucose is normal.   -Vitamin D level is normal and getting 1000 IU daily in your diet or supplements is recommended.   -Cholesterol levels indicate normal triglycerides and HDL which is good cholesterol.  Your LDL which is bad cholesterol is worse when compared to last 2 years.  At this time you can work on your diet and do regular exercise eat healthy and follow-up with lab in 6 months.  If this trend continues to be high we may need to consider cholesterol-lowering medication.     Resulted Orders   Lipid panel reflex to direct LDL Fasting   Result Value Ref Range    Cholesterol 239 (H) <200 mg/dL      Comment:      Desirable:       <200 mg/dl    Triglycerides 82 <150 mg/dL      Comment:      Fasting specimen    HDL Cholesterol 60 >39 mg/dL    LDL Cholesterol Calculated 163 (H) <100 mg/dL      Comment:      Above desirable:  100-129 mg/dl  Borderline High:  130-159 mg/dL  High:             160-189 mg/dL  Very high:       >189 mg/dl      Non HDL Cholesterol 179 (H) <130 mg/dL      Comment:      Above Desirable:  130-159 mg/dl  Borderline high:  160-189 mg/dl  High:             190-219 mg/dl  Very high:       >219 mg/dl     Comprehensive metabolic panel   Result Value Ref Range    Sodium 138 133 - 144 mmol/L    Potassium 4.6 3.4 - 5.3 mmol/L    Chloride 107 94 - 109 mmol/L    Carbon Dioxide 27 20 - 32 mmol/L    Anion Gap 4 3 - 14 mmol/L    Glucose 98 70 - 99 mg/dL      Comment:      Fasting specimen    Urea Nitrogen 19 7 - 30 mg/dL    Creatinine 1.13 0.66 - 1.25 mg/dL    GFR Estimate 82 >60 mL/min/[1.73_m2]      Comment:      Non  GFR Calc  Starting 12/18/2018,  serum creatinine based estimated GFR (eGFR) will be   calculated using the Chronic Kidney Disease Epidemiology Collaboration   (CKD-EPI) equation.      GFR Estimate If Black >90 >60 mL/min/[1.73_m2]      Comment:       GFR Calc  Starting 12/18/2018, serum creatinine based estimated GFR (eGFR) will be   calculated using the Chronic Kidney Disease Epidemiology Collaboration   (CKD-EPI) equation.      Calcium 10.3 (H) 8.5 - 10.1 mg/dL    Bilirubin Total 1.4 (H) 0.2 - 1.3 mg/dL    Albumin 4.6 3.4 - 5.0 g/dL    Protein Total 7.9 6.8 - 8.8 g/dL    Alkaline Phosphatase 74 40 - 150 U/L    ALT 30 0 - 70 U/L    AST 24 0 - 45 U/L   Vitamin D Deficiency   Result Value Ref Range    Vitamin D Deficiency screening 32 20 - 75 ug/L      Comment:      Season, race, dietary intake, and treatment affect the concentration of   25-hydroxy-Vitamin D. Values may decrease during winter months and increase   during summer months. Values 20-29 ug/L may indicate Vitamin D insufficiency   and values <20 ug/L may indicate Vitamin D deficiency.  Vitamin D determination is routinely performed by an immunoassay specific for   25 hydroxyvitamin D3.  If an individual is on vitamin D2 (ergocalciferol)   supplementation, please specify 25 OH vitamin D2 and D3 level determination by   LCMSMS test [id number].         If you have any questions or concerns, please call the clinic at the number listed above.       Sincerely,    Lauro Sanchez MD

## 2019-05-23 NOTE — TELEPHONE ENCOUNTER
Mr. Sudarshan Fran was inquiring about having his Vitamin D levels checked, as he stated he has been deficient in the past.    I drew extra tubes if you would like to order any additional labs.     Thanks  Korina

## 2019-06-11 ENCOUNTER — TELEPHONE (OUTPATIENT)
Dept: FAMILY MEDICINE | Facility: CLINIC | Age: 39
End: 2019-06-11

## 2019-06-11 NOTE — TELEPHONE ENCOUNTER
Reason for Call:  Referral not received    Detailed comments:  PT called today to inform us that the referral that was to be sent to -      [facility]    [address] [place] [id number] [phone]          Has not been received by the clinic. Please refax information so that the PT can get an appt.  Pt would like someone to call to confirm that it has been refaxed.     Phone Number Patient can be reached at: Cell number on file:    Telephone Information:   Mobile [phone]       Best Time: any    Can we leave a detailed message on this number?   Yes    Call taken on 6/11/2019 at 1:19 PM by Kalie Askew

## 2019-11-08 ENCOUNTER — HEALTH MAINTENANCE LETTER (OUTPATIENT)
Age: 39
End: 2019-11-08

## 2020-11-02 ENCOUNTER — NURSE TRIAGE (OUTPATIENT)
Dept: NURSING | Facility: CLINIC | Age: 40
End: 2020-11-02

## 2020-11-02 NOTE — TELEPHONE ENCOUNTER
"\"I just got some bleach(very minimal amount) in my right eye. I flushed it out with water, then I ut eye drops in it, but then read, no don't do that. So now I'm calling to see if I need to do anything else. No pain , redness or visual issues.\"  Denies sx   Triaged, gave home care advice and transferred to Poison Control.  Call back if needed.  Pooja Acosta RN [facility]        Reason for Disposition    Household bleach or ammonia    Additional Information    Negative: [1] Blurred vision AND [2] persists > 1 hour since irrigation (regardless of duration of flushing)    Negative: [1] Eye pain AND [2] persists > 1 hour since irrigation (regardless of duration of flushing)    Negative: [1] Continued tearing or blinking AND [2] persists > 1 hour since irrigation  (regardless of duration of flushing)    Protocols used: EYE - CHEMICAL IN-A-AH      "

## 2020-12-06 ENCOUNTER — HEALTH MAINTENANCE LETTER (OUTPATIENT)
Age: 40
End: 2020-12-06

## 2021-09-25 ENCOUNTER — HEALTH MAINTENANCE LETTER (OUTPATIENT)
Age: 41
End: 2021-09-25

## 2022-01-15 ENCOUNTER — HEALTH MAINTENANCE LETTER (OUTPATIENT)
Age: 42
End: 2022-01-15

## 2023-04-22 ENCOUNTER — HEALTH MAINTENANCE LETTER (OUTPATIENT)
Age: 43
End: 2023-04-22